# Patient Record
Sex: FEMALE | Race: WHITE | Employment: FULL TIME | URBAN - METROPOLITAN AREA
[De-identification: names, ages, dates, MRNs, and addresses within clinical notes are randomized per-mention and may not be internally consistent; named-entity substitution may affect disease eponyms.]

---

## 2022-01-05 ENCOUNTER — OFFICE VISIT (OUTPATIENT)
Dept: URGENT CARE | Age: 57
End: 2022-01-05
Payer: COMMERCIAL

## 2022-01-05 VITALS — TEMPERATURE: 98.9 F | OXYGEN SATURATION: 95 % | RESPIRATION RATE: 18 BRPM | HEART RATE: 89 BPM

## 2022-01-05 DIAGNOSIS — Z20.822 SUSPECTED COVID-19 VIRUS INFECTION: Primary | ICD-10-CM

## 2022-01-05 PROCEDURE — 99202 OFFICE O/P NEW SF 15 MIN: CPT | Performed by: FAMILY MEDICINE

## 2022-01-05 NOTE — PROGRESS NOTES
This patient was seen at [address] [place] [address] [facility] while in their vehicle due to COVID-19 pandemic with PPE and focused examination in order to decrease community viral transmission. The patient/guardian gave verbal consent to treat. The history is provided by the patient. Cough  The history is provided by the patient. This is a new problem. The current episode started more than 2 days ago. The problem occurs every few minutes. The problem has not changed since onset. The cough is non-productive. There has been a fever of 100 - 100.9 F. Associated symptoms include chills, headaches, rhinorrhea and myalgias. Pertinent negatives include no wheezing. She has tried nothing for the symptoms. Risk factors:  has been tested positive last week  She is not a smoker. Her past medical history does not include bronchitis or asthma. History reviewed. No pertinent past medical history. History reviewed. No pertinent surgical history. History reviewed. No pertinent family history. Social History     Socioeconomic History    Marital status: UNKNOWN     Spouse name: Not on file    Number of children: Not on file    Years of education: Not on file    Highest education level: Not on file   Occupational History    Not on file   Tobacco Use    Smoking status: Current Every Day Smoker    Smokeless tobacco: Never Used   Substance and Sexual Activity    Alcohol use: Not on file    Drug use: Not on file    Sexual activity: Not on file   Other Topics Concern    Not on file   Social History Narrative    Not on file     Social Determinants of Health     Financial Resource Strain:     Difficulty of Paying Living Expenses: Not on file   Food Insecurity:     Worried About Running Out of Food in the Last Year: Not on file    Job of Food in the Last Year: Not on file   Transportation Needs:     Lack of Transportation (Medical): Not on file    Lack of Transportation (Non-Medical):  Not on file   Physical Activity:     Days of Exercise per Week: Not on file    Minutes of Exercise per Session: Not on file   Stress:     Feeling of Stress : Not on file   Social Connections:     Frequency of Communication with Friends and Family: Not on file    Frequency of Social Gatherings with Friends and Family: Not on file    Attends Shinto Services: Not on file    Active Member of [address] [place] [address] or Organizations: Not on file    Attends Club or Organization Meetings: Not on file    Marital Status: Not on file   Intimate Partner Violence:     Fear of Current or Ex-Partner: Not on file    Emotionally Abused: Not on file    Physically Abused: Not on file    Sexually Abused: Not on file   Housing Stability:     Unable to Pay for Housing in the Last Year: Not on file    Number of [place] in the Last Year: Not on file    Unstable Housing in the Last Year: Not on file                ALLERGIES: Patient has no known allergies. Review of Systems   Constitutional: Positive for chills. HENT: Positive for rhinorrhea. Respiratory: Positive for cough. Negative for wheezing. Musculoskeletal: Positive for myalgias. Neurological: Positive for headaches. All other systems reviewed and are negative. Vitals:    01/05/22 1519   Pulse: 89   Resp: 18   Temp: 98.9 °F (37.2 °C)   SpO2: 95%       Physical Exam  Vitals and nursing note reviewed. Constitutional:       General: She is not in acute distress. Appearance: She is not ill-appearing. Pulmonary:      Effort: Pulmonary effort is normal. No respiratory distress. Breath sounds: No wheezing. MDM    Procedures      ICD-10-CM ICD-9-CM    1. Suspected COVID-19 virus infection  Z20.822 V01.79 NOVEL CORONAVIRUS (COVID-19)     No orders of the defined types were placed in this encounter. No results found for any visits on 01/05/22. The patients condition was discussed with the patient and they understand.   The patient is to follow up with primary care doctor. If signs and symptoms become worse the pt is to go to the ER. The patient is to take medications as prescribed.

## 2022-01-07 LAB
SARS-COV-2, NAA 2 DAY TAT: NORMAL
SARS-COV-2, NAA: DETECTED

## 2022-01-08 NOTE — PROGRESS NOTES
Spoke with pt, name and  confirmed. Notified of positive COVID results. Notified of physician recommendations. Pt verbalized understanding.

## 2022-10-18 ENCOUNTER — OFFICE VISIT (OUTPATIENT)
Dept: ORTHOPEDIC SURGERY | Age: 57
End: 2022-10-18
Payer: COMMERCIAL

## 2022-10-18 VITALS — HEIGHT: 69 IN | WEIGHT: 170 LBS | BODY MASS INDEX: 25.18 KG/M2

## 2022-10-18 DIAGNOSIS — M77.12 LATERAL EPICONDYLITIS, LEFT ELBOW: ICD-10-CM

## 2022-10-18 DIAGNOSIS — M77.02 MEDIAL EPICONDYLITIS OF ELBOW, LEFT: ICD-10-CM

## 2022-10-18 DIAGNOSIS — M25.522 LEFT ELBOW PAIN: Primary | ICD-10-CM

## 2022-10-18 PROCEDURE — 99203 OFFICE O/P NEW LOW 30 MIN: CPT | Performed by: ORTHOPAEDIC SURGERY

## 2022-10-18 NOTE — PROGRESS NOTES
Fermin Eddy (: 1965) is a 61 y.o. female patient here for evaluation of the following chief complaint(s):  Elbow Pain (Left elbow)       ASSESSMENT/PLAN:  Below is the assessment and plan developed based on review of pertinent history, physical exam, labs, studies, and medications. 1. Left elbow pain  -     XR ELBOW LT AP/LAT; Future  2. Lateral epicondylitis, left elbow  3. Medial epicondylitis of elbow, left      We discussed the patient's lateral epicondylitis or tennis elbow. We reviewed treatment options including activity modification, medication including anti-inflammatories, ice and heat, counterforce brace, physical therapy and or home stretching exercises. We also discussed corticosteroid injections if needed. In addition we reviewed that most patients will get better with nonsurgical management however in some cases which are persistent we may consider surgical intervention. She wants to begin with counterforce brace and continue Voltaren. Also gave her home exercise program.  If no better would consider corticosteroid injection. Patient verbalized understanding and elected to proceed. All questions were answered to the patient's apparent satisfaction. SUBJECTIVE/OBJECTIVE:  HPI    29-year-old female with left elbow pain. Originally had an issue in the 1980s was treated conservatively and has been doing okay until the last few months. She describes pain throughout the elbow both medially and laterally. Describes some crepitus at times. Patient reports a gradual onset of symptoms. Duration of problem 30 years. Symptom Severity 8/10  Symptom Frequency constant        No Known Allergies    No current outpatient medications on file. No current facility-administered medications for this visit.        Social History     Socioeconomic History    Marital status: UNKNOWN     Spouse name: Not on file    Number of children: Not on file    Years of education: Not on file Highest education level: Not on file   Occupational History    Not on file   Tobacco Use    Smoking status: Every Day    Smokeless tobacco: Never   Vaping Use    Vaping Use: Never used   Substance and Sexual Activity    Alcohol use: Not Currently    Drug use: Never    Sexual activity: Not Currently   Other Topics Concern    Not on file   Social History Narrative    Not on file     Social Determinants of Health     Financial Resource Strain: Not on file   Food Insecurity: Not on file   Transportation Needs: Not on file   Physical Activity: Not on file   Stress: Not on file   Social Connections: Not on file   Intimate Partner Violence: Not on file   Housing Stability: Not on file       History reviewed. No pertinent surgical history. History reviewed. No pertinent family history. Review of Systems    No flowsheet data found. Vitals:  Ht 5' 9\" (1.753 m)   Wt 170 lb (77.1 kg)   BMI 25.10 kg/m²    Estimated body surface area is 1.94 meters squared as calculated from the following:    Height as of this encounter: 5' 9\" (1.753 m). Weight as of this encounter: 170 lb (77.1 kg). Body mass index is 25.1 kg/m². Physical Exam    Musculoskeletal Exam:    Left ELBOW EXAMINATION    ROM:    - Flexion: 140   - Exension: 0   - Pronation: full   - Supination: full    Lateral Epicondyle TTP: Positive    Wrist Extension Test: Positive    Middle Finger Extension Test: Negative    Medial Epicondyle TTP: Positive    Wrist Flexion Test: Negative    Resisted Pronation: Positive    Resisted Supination: Negative    Patient fires AIN, PIN and ulnar nerves. Sensation is grossly intact in the median, radial and ulnar distribution. Hand is pink and appears well-perfused. Hand is warm. Skin is intact. Compartments are soft and compressible.       Consitutional: Healthy  Skin:   - Edema - none  - Cellulitis - No    Neuro: Numbness or tingling in R/L arm: No    Psych: Affect normal    Cardiovascular: Capillary Refill < 2 seconds in upper extremities    Respiratory: Non-Labored Breathing    ROS:    Constitutional: Denies fever/chills    Respiratory: Denies SOB        Imaging:    XR Results (most recent):  Results from Appointment encounter on 10/18/22    XR ELBOW LT AP/LAT    Narrative  Left Elbow Xray:  Indication: pain  Views: 2, AP/LAT    Interpretation: 2 views of the left elbow are reviewed and show normal bone architecture and no evidence of effusion or fracture. There is no arthritis or subluxation of the ulnohumeral or radiohumeral joints. There is no evidence of soft tissue swelling. Orders Placed This Encounter    XR ELBOW LT AP/LAT     Standing Status:   Future     Number of Occurrences:   1     Standing Expiration Date:   10/19/2023        An electronic signature was used to authenticate this note.   -- Michelle Ruelas MD

## 2022-10-18 NOTE — PATIENT INSTRUCTIONS
From the [id number] [facility]    Tennis Elbow - Lateral Epicondylitis    Lateral epicondylitis, commonly known as tennis elbow, is a painful condition involving the tendons that attach to the bone on the outside (lateral) part of the elbow. Tendons transmit a muscles force to the bone. The muscle involved in this condition, the extensor carpi radialis brevis, helps to straighten and stabilize the wrist (Figure 1). With lateral epicondylitis, there is degeneration of the tendons attachment, weakening the anchor site and placing greater stress on the area. This can lead to pain associated with activities in which this muscle is active, such as lifting, gripping and/or grasping. Sports such as tennis are commonly associated with this, but the problem can occur with many different activities. Figure 1  A muscle involved in tennis elbow - the extensor carpi radialis brevis - helps to extend and stabilize the wrist        Causes    This condition most commonly affects individuals between 27and 48years old, but it can occur in all ages and in both men and women. Here are some potential causes of this condition:    Overuse: This can be both non-work and work-related. Overuse can happen from repetitive gripping and grasping activities such as meat cutting, plumbing, painting, auto- work, etc.    Trauma: Although less common, a direct blow to the elbow may result in swelling of the tendon that can lead to degeneration. This can make the elbow more susceptible to an overuse injury. Signs and Symptoms    Pain is the primary reason for patients to seek medical evaluation for tennis elbow. The pain is located on the outside of the elbow, over the bone region known as the lateral epicondyle. This area can become tender to the touch. Pain is also produced by any activity which places stress on the tendon, such as gripping or lifting.  With activity, the pain usually starts at the elbow and may travel down the forearm to the hand. Occasionally, any motion of the elbow can be painful. Treatment    With tennis elbow, some patients will find that their symptoms go away spontaneously within a year. For others, both surgical and non-surgical treatments are available. Non-surgical treatments will almost always be considered first.    These can include: Activity modification: Initially, the activity causing the condition should be limited. Modifying  or techniques, such as use of a different size racket in tennis, may relieve the problem. Medication: Anti-inflammatory medications may help alleviate the pain. Brace: A tennis elbow brace, a band worn over the muscle of the forearm just below the elbow, can reduce the tension on the tendon and allow it to heal.    Physical therapy: Stretching and/or strengthening exercises, ultrasound, or heat treatments may help the pain. Steroid injections: A steroid is a strong anti-inflammatory medication that can be injected into the area. Autologous blood injections (JIMMIE) or platelet rich plasma (PRP): This includes withdrawing blood from an uninjured site and reinjecting it into the area of the lateral epicondyle. This therapy is a major focus of new research and offers some promise. Surgery is only considered when the pain is incapacitating and has not responded to other treatments, and when symptoms have lasted six to 12 months. Surgery involves removing the diseased, degenerated tendon tissue. The surgery would be performed in an outpatient setting. Recovery    Recovery from surgery will include physical therapy to regain motion of the arm. A strengthening program will be necessary to return to prior activities. Recovery can be expected to take several months. Talk to your hand surgeon to determine the best treatment option for you.

## 2022-11-01 ENCOUNTER — OFFICE VISIT (OUTPATIENT)
Dept: ORTHOPEDIC SURGERY | Age: 57
End: 2022-11-01
Payer: COMMERCIAL

## 2022-11-01 VITALS — BODY MASS INDEX: 26.66 KG/M2 | HEIGHT: 69 IN | WEIGHT: 180 LBS

## 2022-11-01 DIAGNOSIS — M77.12 LATERAL EPICONDYLITIS, LEFT ELBOW: Primary | ICD-10-CM

## 2022-11-01 PROCEDURE — 99213 OFFICE O/P EST LOW 20 MIN: CPT | Performed by: ORTHOPAEDIC SURGERY

## 2022-11-01 PROCEDURE — 20551 NJX 1 TENDON ORIGIN/INSJ: CPT | Performed by: ORTHOPAEDIC SURGERY

## 2022-11-01 RX ORDER — BETAMETHASONE SODIUM PHOSPHATE AND BETAMETHASONE ACETATE 3; 3 MG/ML; MG/ML
6 INJECTION, SUSPENSION INTRA-ARTICULAR; INTRALESIONAL; INTRAMUSCULAR; SOFT TISSUE ONCE
Status: COMPLETED | OUTPATIENT
Start: 2022-11-01 | End: 2022-11-01

## 2022-11-01 RX ORDER — BUPIVACAINE HYDROCHLORIDE 5 MG/ML
1 INJECTION, SOLUTION EPIDURAL; INTRACAUDAL ONCE
Status: COMPLETED | OUTPATIENT
Start: 2022-11-01 | End: 2022-11-01

## 2022-11-01 RX ADMIN — BETAMETHASONE SODIUM PHOSPHATE AND BETAMETHASONE ACETATE 6 MG: 3; 3 INJECTION, SUSPENSION INTRA-ARTICULAR; INTRALESIONAL; INTRAMUSCULAR; SOFT TISSUE at 09:08

## 2022-11-01 RX ADMIN — BUPIVACAINE HYDROCHLORIDE 5 MG: 5 INJECTION, SOLUTION EPIDURAL; INTRACAUDAL at 09:08

## 2022-11-01 NOTE — PROGRESS NOTES
Geeta Owusu (: 1965) is a 61 y.o. female patient here for evaluation of the following chief complaint(s):  Elbow Pain (Left elbow injection )       ASSESSMENT/PLAN:  Below is the assessment and plan developed based on review of pertinent history, physical exam, labs, studies, and medications. 1. Lateral epicondylitis, left elbow      We discussed the patient's lateral epicondylitis or tennis elbow. We reviewed treatment options including activity modification, medication including anti-inflammatories, ice and heat, counterforce brace, physical therapy and or home stretching exercises. We also discussed corticosteroid injections if needed. In addition we reviewed that most patients will get better with nonsurgical management however in some cases which are persistent we may consider surgical intervention. She wanted to proceed with steroid injection today and she tolerated this well. Follow-up as needed could consider repeat injection in 3 to 4 months if still painful. Patient verbalized understanding and elected to proceed. All questions were answered to the patient's apparent satisfaction. SUBJECTIVE/OBJECTIVE:  HPI    65-year-old female following up on her left lateral epicondylitis. She states medial side is gotten better but the lateral side is persistently painful and wanted to try an injection. Patient reports a gradual onset of symptoms. Duration of problem 30 years. Worse 1 month ago. Symptom Severity 6/10  Symptom Frequency constant        No Known Allergies    No current outpatient medications on file. No current facility-administered medications for this visit.        Social History     Socioeconomic History    Marital status: UNKNOWN     Spouse name: Not on file    Number of children: Not on file    Years of education: Not on file    Highest education level: Not on file   Occupational History    Not on file   Tobacco Use    Smoking status: Every Day    Smokeless tobacco: Never   Vaping Use    Vaping Use: Never used   Substance and Sexual Activity    Alcohol use: Not Currently    Drug use: Never    Sexual activity: Not Currently   Other Topics Concern    Not on file   Social History Narrative    Not on file     Social Determinants of Health     Financial Resource Strain: Not on file   Food Insecurity: Not on file   Transportation Needs: Not on file   Physical Activity: Not on file   Stress: Not on file   Social Connections: Not on file   Intimate Partner Violence: Not on file   Housing Stability: Not on file       History reviewed. No pertinent surgical history. History reviewed. No pertinent family history. Review of Systems    No flowsheet data found. Vitals:  Ht 5' 9\" (1.753 m)   Wt 180 lb (81.6 kg)   BMI 26.58 kg/m²    Estimated body surface area is 1.99 meters squared as calculated from the following:    Height as of this encounter: 5' 9\" (1.753 m). Weight as of this encounter: 180 lb (81.6 kg). Body mass index is 26.58 kg/m². Physical Exam    Musculoskeletal Exam:    Left ELBOW EXAMINATION    ROM:    - Flexion: 140   - Exension: 0   - Pronation: full   - Supination: full    Lateral Epicondyle TTP: Positive    Wrist Extension Test: Positive    Middle Finger Extension Test: Negative    Medial Epicondyle TTP: Positive    Wrist Flexion Test: Negative    Resisted Pronation: Positive    Resisted Supination: Negative    Patient fires AIN, PIN and ulnar nerves. Sensation is grossly intact in the median, radial and ulnar distribution. Hand is pink and appears well-perfused. Hand is warm. Skin is intact. Compartments are soft and compressible.       Consitutional: Healthy  Skin:   - Edema - none  - Cellulitis - No    Neuro: Numbness or tingling in R/L arm: No    Psych: Affect normal    Cardiovascular: Capillary Refill < 2 seconds in upper extremities    Respiratory: Non-Labored Breathing    ROS:    Constitutional: Denies fever/chills    Respiratory: Denies SOB        Imaging:    XR Results (most recent):  Results from Appointment encounter on 10/18/22    XR ELBOW LT AP/LAT    Narrative  Left Elbow Xray:  Indication: pain  Views: 2, AP/LAT    Interpretation: 2 views of the left elbow are reviewed and show normal bone architecture and no evidence of effusion or fracture. There is no arthritis or subluxation of the ulnohumeral or radiohumeral joints. There is no evidence of soft tissue swelling. PROCEDURE:     Lateral Epicondyle Injection:    We discussed the possibility of injection of a steroid mixed with a local anesthetic to relieve pain and decrease inflammation of the affected area. The risks of a steroid injection which include but are not limited to cartilage damage, death of nearby bone, joint infection, nerve damage, temporary facial flushing, temporary steroid flare of pain and inflammation in the joint, temporary increase in blood sugar, tendon weakening or rupture, thinning of nearby bone (osteoporosis), thinning of skin and soft tissue around the injection site, and whitening or lightening of the skin around the injection site were reviewed. After explaining the risks and benefits of the procedure and obtaining informed consent from the patient, the proposed area for injection was confirmed with the patient. After all questions and concerns were addressed, the skin was prepped with alcohol to reduce the chances of infection. The skin was anesthetized with topical ethylene chloride spray. Next, the left lateral epicondyle was injected with 1 cc of of 0.5% bupivacaine and 1 cc of Betamethasone (6mg/mL). Patient tolerated the procedure well without any complications. The needle was removed and disposed of in a sterile container. The patient tolerated the injection well and a band-aid was placed on the skin.  The patient was counseled on protecting the injection area, icing for pain relief and looking for signs and symptoms of infection. We requested that the patient contact us if any symptoms persist greater than 48 hours after the injection. No orders of the defined types were placed in this encounter. An electronic signature was used to authenticate this note.   -- Nikki Barron MD patient